# Patient Record
Sex: FEMALE | Race: WHITE | Employment: UNEMPLOYED | URBAN - METROPOLITAN AREA
[De-identification: names, ages, dates, MRNs, and addresses within clinical notes are randomized per-mention and may not be internally consistent; named-entity substitution may affect disease eponyms.]

---

## 2017-05-22 ENCOUNTER — TELEPHONE (OUTPATIENT)
Dept: PALLATIVE CARE | Age: 27
End: 2017-05-22

## 2017-05-22 NOTE — TELEPHONE ENCOUNTER
Patients mom requesting to speak to Dr. Ange Zaragoza or Dr. Mima King. On new treatments, new doctors are suggesting for patient.  Would like to discuss if possible

## 2017-06-15 ENCOUNTER — TELEPHONE (OUTPATIENT)
Dept: PALLATIVE CARE | Age: 27
End: 2017-06-15

## 2017-07-06 ENCOUNTER — DOCUMENTATION ONLY (OUTPATIENT)
Dept: PALLATIVE CARE | Age: 27
End: 2017-07-06

## 2017-07-06 NOTE — PROGRESS NOTES
Palliative Medicine    Discussed case with mother Aman Dickson who feels that while Esme Samuel is more functional as she is living in a 1 story home, her pain management is worse. She continues to have severe attacks inadequately relieved with PCA as well as oral medications. She has stopped having periods and is having worsening bladder symptoms of incontinence. She is finding it difficult to find medical specialists who could guide her care in [place]. Discussed case with Dr. Sujatha Gates MD in [facility] who is currently caring for Ms. Zunilda Pedroza. Dr. Handy Cook is not prepared to adjust the complex opioid regimen she is on. Office notes reviewed - last visit 1/28/17    Medications:  Dilaudid 0.8mg/hr with 2mg every 10 minute bolus  Dilaudid 1mg/ml - 5mg every 2 hours as needed for pain   Oxycodone 20mg/ml - 20mg every 2 hours as needed for pain  Approximately 5 doses of opioid liquid (either dilaudid or oxycodone) in 24 hours  Celexa 10mg daily  Ativan 1mg every 4 hours as needed  Marinol 10mg three times daily  Courtney-colace 2 tabs daily   Vitamin D 50,000 units weekly  Zofran 4mg 2 tabs every 8 hours as needed    Lab Results  Essentially normal from 6/29/16 prior to her move    Recommendations:  1. If there is a palliative specialist to support her management in the ambulatory environment or the home environment, this would be ideal  2. Periodic blood tests I would recommend:  -Annual TSH, Vitamin D, B12&Folate, Iron  And Trans Saturation  -Quarterly CBC and Comprehensive Metabolic with Magnesium and Phosphorus  3. Re-evaluation for Endocrine and Rheumatologic conditions x 1  -Consider specialty consultation with Endocrine given lack of periods and long-term opioid use which can affect hypothalamic-pituitary axis and also affect bone density  -Consider specialty consultation with Rheumatology with Ehler-Danlos Syndrome and its affect on joints (consider ESR, KENZIE, RA, anti-CCP for evaluation)  4.  Prescribe Narcan 0.4mg nasal spray for emergency if accidental overdose    Pain Management Changes  I suspect she has developed rapid cycle withdrawal with the ongoing use of short acting medications  Methadone could be considered in place of the PCA and eventually 1 of the liquid breakthrough medications  She would need to have an EKG to assess the QTc before prescribing to ensure this is not prolonged   I would recommend stopping Dilaudid basal rate of 0.8mg/hr (continuing PCA dose) and starting Methadone 2.5mg every 8 hours x 7 days then increase to 5mg every 8 hours  Dose adjustments no sooner than every 7 days being aware of sedation  If this is tolerated, then we would work with simplifying her short acting breakthrough medications including the Dilaudid PA dose    Article reference:  http://annals. org/aim/article/[email or web address]/ozm-chcljqly-duozghrxv-methadone-treatment

## 2017-08-29 ENCOUNTER — TELEPHONE (OUTPATIENT)
Dept: PALLATIVE CARE | Age: 27
End: 2017-08-29

## 2017-09-08 ENCOUNTER — TELEPHONE (OUTPATIENT)
Dept: PALLATIVE CARE | Age: 27
End: 2017-09-08

## 2017-09-08 NOTE — TELEPHONE ENCOUNTER
Returning call from patient's mother, Que Martin . No answer so left voicemail asking for her to call me back.

## 2021-06-25 ENCOUNTER — TELEPHONE (OUTPATIENT)
Dept: PALLATIVE CARE | Age: 31
End: 2021-06-25

## 2021-06-25 NOTE — TELEPHONE ENCOUNTER
The patient's mother, Karen Wolf states she was in Palliative care and they moved away. Her husbands job is relocating him to [place] and she would like to get her back into Palliative.   # [phone]

## 2021-06-25 NOTE — TELEPHONE ENCOUNTER
Returned call to patient's mother Samson Jett. Yareli Smith was a former Palliative Home patient of Dr. Live Frazier. Patient moved to [place] in 2017. Pt's mother states patient's father is being transferred back to [place] and they are looking for care for patient when they return to the [facility]. She states that Liberty Díaz is currently being seen by a home visit practice in [place] that has a pain management specialist on the team.  According to Ms. Vazquez Jackson condition has improved and her opioid dosage has decreased since she left [place]. She has been diagnosed with a mitochondrial disease. Ms. Vj Up reports that patient can walk very short distances and although she is essentially homebound, they might be able to get her into a physician's office (but probably not more than once a month). Patient has an [employer] insurance plan as primary and Medicaid as secondary insurance. Ms. Vj Up states they are hoping to move to the [facility] in August 2021, probably to the [address] [place] [address]. This nurse will speak to  Dr. Neil White about possible resources for patient and will call Ms. Noah back next week. She is agreeable to this plan.     Geovanni Marcum RN

## 2021-06-28 ENCOUNTER — TELEPHONE (OUTPATIENT)
Dept: PALLATIVE CARE | Age: 31
End: 2021-06-28

## 2021-06-28 NOTE — TELEPHONE ENCOUNTER
[facility] Palliative Medicine Office  Nursing Note  [phone]  Fax [phone]     Name:  Chele Pham  YOB: 1990     This nurse returned call to patient's mother Ana Johnston regarding services for this former Palliative Home patient when the family moves back to [place] in a few months. See this nurse's note from 6/25/21. This nurse heard back from Dr. Mallory Cho and Dr. Tracie Love. Neither physician can accept William Christine for home visits at this time. Since patient is on a lower opioid dose than when she left [place] in 2017 and she is now able to get out of the house for limited visits, this nurse suggested pt's mother contact a pain management specialist in the [place] area.   Two possibilities of physicians that will prescribe pain medication for non-cancer patients are:    Geri Li - Dr. Daniela Fan                                                                                                                                                                                                                                                                                      [address] [place] [address]        [phone]         Dr. Justice Muniz    [phone] [place], [place] [address]    Ms. Liliya Peña states she will call Tiffany Davis and Alberto. If neither of them work out, she will call our office back. She says pt is now taking Methadone 4 times daily and oxycodone for breakthrough pain.     Brett Hartman, BSN, RN  Palliative Medicine  [phone]

## 2021-07-20 ENCOUNTER — TELEPHONE (OUTPATIENT)
Dept: FAMILY MEDICINE CLINIC | Age: 31
End: 2021-07-20

## 2021-07-20 NOTE — TELEPHONE ENCOUNTER
[facility]   Phone:  [phone]      Fax:  [phone] [employer], [address] [place] [address], [address]    Name:  Evelia Alvarado  YOB: 1990    Incoming call from patient's mother Meagan Coelho with an update. Her daughter Gavino Gama is a former Palliative Home patient of Dr. Jackson Cheng. Patient moved to [place]. Patient's father is being transferred back to [place] and they will be relocating to [place] in August.   See this nurse's notes from 6/25/21 and 6/28/21. MsHarley Anand says  she called Tiffany Mliian and Roni and it doesn't look like they are going to work out. [facility] is currently on hold for new patients. This nurse suggested that she try [facility]. [phone].   She says she will call them.        Myrna Novak, LE, RN-BC, [facility]  Referral Navigator, Home Based [address]

## 2022-12-18 ENCOUNTER — HOSPITAL ENCOUNTER (EMERGENCY)
Age: 32
Discharge: HOME | End: 2022-12-18
Attending: EMERGENCY MEDICINE
Payer: COMMERCIAL

## 2022-12-18 ENCOUNTER — APPOINTMENT (OUTPATIENT)
Dept: GENERAL RADIOLOGY | Age: 32
End: 2022-12-18
Attending: EMERGENCY MEDICINE
Payer: COMMERCIAL

## 2022-12-18 ENCOUNTER — APPOINTMENT (OUTPATIENT)
Dept: CT IMAGING | Age: 32
End: 2022-12-18
Attending: EMERGENCY MEDICINE
Payer: COMMERCIAL

## 2022-12-18 VITALS
HEIGHT: 67 IN | OXYGEN SATURATION: 98 % | RESPIRATION RATE: 12 BRPM | DIASTOLIC BLOOD PRESSURE: 68 MMHG | WEIGHT: 168 LBS | HEART RATE: 65 BPM | TEMPERATURE: 98.8 F | BODY MASS INDEX: 26.37 KG/M2 | SYSTOLIC BLOOD PRESSURE: 104 MMHG

## 2022-12-18 DIAGNOSIS — R07.9 CHEST PAIN, UNSPECIFIED TYPE: Primary | ICD-10-CM

## 2022-12-18 DIAGNOSIS — U07.1 COVID: ICD-10-CM

## 2022-12-18 LAB
ALBUMIN SERPL-MCNC: 3.1 G/DL (ref 3.5–5)
ALBUMIN/GLOB SERPL: 0.8 {RATIO} (ref 1.1–2.2)
ALP SERPL-CCNC: 104 U/L (ref 45–117)
ALT SERPL-CCNC: 61 U/L (ref 12–78)
ANION GAP SERPL CALC-SCNC: 4 MMOL/L (ref 5–15)
AST SERPL-CCNC: 41 U/L (ref 15–37)
BASOPHILS # BLD: 0 K/UL (ref 0–0.1)
BASOPHILS NFR BLD: 1 % (ref 0–1)
BILIRUB SERPL-MCNC: 0.3 MG/DL (ref 0.2–1)
BUN SERPL-MCNC: 16 MG/DL (ref 6–20)
BUN/CREAT SERPL: 29 (ref 12–20)
CALCIUM SERPL-MCNC: 8.3 MG/DL (ref 8.5–10.1)
CHLORIDE SERPL-SCNC: 108 MMOL/L (ref 97–108)
CO2 SERPL-SCNC: 29 MMOL/L (ref 21–32)
COMMENT, HOLDF: NORMAL
CREAT SERPL-MCNC: 0.56 MG/DL (ref 0.55–1.02)
D DIMER PPP FEU-MCNC: 1.13 MG/L FEU (ref 0–0.65)
DIFFERENTIAL METHOD BLD: NORMAL
EOSINOPHIL # BLD: 0.1 K/UL (ref 0–0.4)
EOSINOPHIL NFR BLD: 1 % (ref 0–7)
ERYTHROCYTE [DISTWIDTH] IN BLOOD BY AUTOMATED COUNT: 12 % (ref 11.5–14.5)
GLOBULIN SER CALC-MCNC: 3.8 G/DL (ref 2–4)
GLUCOSE SERPL-MCNC: 105 MG/DL (ref 65–100)
HCT VFR BLD AUTO: 37.6 % (ref 35–47)
HGB BLD-MCNC: 12.3 G/DL (ref 11.5–16)
IMM GRANULOCYTES # BLD AUTO: 0 K/UL (ref 0–0.04)
IMM GRANULOCYTES NFR BLD AUTO: 0 % (ref 0–0.5)
LYMPHOCYTES # BLD: 1.6 K/UL (ref 0.8–3.5)
LYMPHOCYTES NFR BLD: 28 % (ref 12–49)
MCH RBC QN AUTO: 30.4 PG (ref 26–34)
MCHC RBC AUTO-ENTMCNC: 32.7 G/DL (ref 30–36.5)
MCV RBC AUTO: 93.1 FL (ref 80–99)
MONOCYTES # BLD: 0.5 K/UL (ref 0–1)
MONOCYTES NFR BLD: 9 % (ref 5–13)
NEUTS SEG # BLD: 3.4 K/UL (ref 1.8–8)
NEUTS SEG NFR BLD: 61 % (ref 32–75)
NRBC # BLD: 0 K/UL (ref 0–0.01)
NRBC BLD-RTO: 0 PER 100 WBC
PLATELET # BLD AUTO: 168 K/UL (ref 150–400)
PMV BLD AUTO: 11.5 FL (ref 8.9–12.9)
POTASSIUM SERPL-SCNC: 3.9 MMOL/L (ref 3.5–5.1)
PROT SERPL-MCNC: 6.9 G/DL (ref 6.4–8.2)
RBC # BLD AUTO: 4.04 M/UL (ref 3.8–5.2)
SAMPLES BEING HELD,HOLD: NORMAL
SODIUM SERPL-SCNC: 141 MMOL/L (ref 136–145)
TROPONIN-HIGH SENSITIVITY: <4 NG/L (ref 0–51)
TROPONIN-HIGH SENSITIVITY: <4 NG/L (ref 0–51)
WBC # BLD AUTO: 5.6 K/UL (ref 3.6–11)

## 2022-12-18 PROCEDURE — 93005 ELECTROCARDIOGRAM TRACING: CPT

## 2022-12-18 PROCEDURE — 85379 FIBRIN DEGRADATION QUANT: CPT

## 2022-12-18 PROCEDURE — 80053 COMPREHEN METABOLIC PANEL: CPT

## 2022-12-18 PROCEDURE — 99285 EMERGENCY DEPT VISIT HI MDM: CPT

## 2022-12-18 PROCEDURE — 84484 ASSAY OF TROPONIN QUANT: CPT

## 2022-12-18 PROCEDURE — 36415 COLL VENOUS BLD VENIPUNCTURE: CPT

## 2022-12-18 PROCEDURE — 71275 CT ANGIOGRAPHY CHEST: CPT

## 2022-12-18 PROCEDURE — 74011000636 HC RX REV CODE- 636: Performed by: RADIOLOGY

## 2022-12-18 PROCEDURE — 85025 COMPLETE CBC W/AUTO DIFF WBC: CPT

## 2022-12-18 PROCEDURE — 71045 X-RAY EXAM CHEST 1 VIEW: CPT

## 2022-12-18 RX ADMIN — IOPAMIDOL 64 ML: 755 INJECTION, SOLUTION INTRAVENOUS at 04:02

## 2022-12-18 NOTE — ED PROVIDER NOTES
82-year-old female with history of mitochondrial disorder, gastroparesis, GERD, dysautonomia, complex regional pain syndrome on chronic narcotic therapy presents to the emergency department by EMS with a chief complaint of chest pressure. Symptoms for the past hour. She was diagnosed with COVID yesterday. Her mother provides a significant portion of the history tells me that she was bedbound for 7 years due to a perfect storm\" of her chronic medical conditions. Previous Dilaudid PCA at home. The history is provided by the patient, a parent and medical records. Chest Pain (Angina)   This is a new problem. The problem occurs constantly. Associated symptoms include weakness (Chronic). Pertinent negatives include no abdominal pain, no cough, no fever, no headaches, no nausea, no shortness of breath and no vomiting.       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 No evidence of iron overload     Other ill-defined conditions(799.89)     Disautonomia 7/2013    Stool color black        Past Surgical History:   Procedure Laterality Date    HX ORTHOPAEDIC      HX PACEMAKER PLACEMENT      For Gastroparesis         Family History:   Problem Relation Age of Onset    Hypertension Mother     Hypertension Father        Social History     Socioeconomic History    Marital status: SINGLE     Spouse name: Not on file    Number of children: Not on file    Years of education: Not on file    Highest education level: Not on file   Occupational History    Not on file   Tobacco Use    Smoking status: Never    Smokeless tobacco: Never   Substance and Sexual Activity    Alcohol use: No    Drug use: No    Sexual activity: Never   Other Topics Concern    Not on file   Social History Narrative    Not on file     Social Determinants of [facility]     Financial Resource Strain: Not on file   Food Insecurity: Not on file   Transportation Needs: Not on file   Physical Activity: Not on file   Stress: Not on file   Social Connections: Not on file   Intimate Partner Violence: Not on file   Housing Stability: Not on file         ALLERGIES: Atropine, Compazine [prochlorperazine edisylate], Phenergan [promethazine], and Reglan [metoclopramide]    Review of Systems   Constitutional:  Positive for fatigue. Negative for fever. HENT:  Negative for sneezing and sore throat. Respiratory:  Negative for cough and shortness of breath. Cardiovascular:  Positive for chest pain. Negative for leg swelling. Gastrointestinal:  Negative for abdominal pain, diarrhea, nausea and vomiting. Genitourinary:  Negative for difficulty urinating and dysuria. Musculoskeletal:  Negative for arthralgias and myalgias. Skin:  Negative for color change and rash. Neurological:  Positive for weakness (Chronic). Negative for headaches. Psychiatric/Behavioral:  Negative for agitation and behavioral problems. There were no vitals filed for this visit. Physical Exam  Vitals and nursing note reviewed. Constitutional:       General: She is not in acute distress. Appearance: Normal appearance. She is well-developed. She is not ill-appearing, toxic-appearing or diaphoretic. HENT:      Head: Normocephalic and atraumatic. Nose: Nose normal.      Mouth/Throat:      Mouth: Mucous membranes are moist.      Pharynx: Oropharynx is clear. Eyes:      Extraocular Movements: Extraocular movements intact. Conjunctiva/sclera: Conjunctivae normal.      Pupils: Pupils are equal, round, and reactive to light. Cardiovascular:      Rate and Rhythm: Normal rate and regular rhythm. Pulses: Normal pulses. Heart sounds: Normal heart sounds. Pulmonary:      Effort: Pulmonary effort is normal. No respiratory distress.       Breath sounds: Normal breath sounds. No wheezing. Chest:      Chest wall: No tenderness. Abdominal:      General: Abdomen is flat. There is no distension. Palpations: Abdomen is soft. Tenderness: There is no abdominal tenderness. There is no guarding or rebound. Musculoskeletal:         General: No swelling, tenderness, deformity or signs of injury. Normal range of motion. Cervical back: Normal range of motion and neck supple. No rigidity. No muscular tenderness. Right lower leg: No edema. Left lower leg: No edema. Skin:     General: Skin is warm and dry. Capillary Refill: Capillary refill takes less than 2 seconds. Neurological:      General: No focal deficit present. Mental Status: She is alert and oriented to person, place, and time. Psychiatric:         Mood and Affect: Mood normal.         Behavior: Behavior normal.        MDM  Number of Diagnoses or Management Options  Chest pain, unspecified type  COVID  Diagnosis management comments: 72-year-old female presents as above with chest pain in the setting of COVID infection. Work-up in the emergency department is reassuring with low risk for ACS. No evidence of PTX, PE or other serious pathology on CT scan. No focal pneumonia. Elevated D-dimer likely related to inflammatory state associated with COVID. She can be safely discharged home with instructions to follow-up with primary care and return if needed. Amount and/or Complexity of Data Reviewed  Clinical lab tests: reviewed  Tests in the radiology section of CPT®: reviewed      ED Course as of 12/18/22 0526   Sun Dec 18, 2022   0202   ED EKG interpretation:  Rhythm: normal sinus rhythm. Rate (approx.): 84. Axis: normal.  ST segment:  No concerning ST elevations or depressions. This EKG was interpreted by Earnestine Lefort, MD,ED Provider. [JM]   0959 I have independently viewed the obtained radiographic images and note chest x-ray without acute findings. Will await radiology read. [JM]   9442 I have independently viewed the obtained radiographic images and note CTA chest without PE, no significant lung parenchymal abnormality. Will await radiology read.  [JM]      ED Course User Index  [JM] Jerica Elmore MD       Procedures

## 2022-12-19 LAB
ATRIAL RATE: 84 BPM
CALCULATED P AXIS, ECG09: 24 DEGREES
CALCULATED R AXIS, ECG10: 51 DEGREES
CALCULATED T AXIS, ECG11: 32 DEGREES
DIAGNOSIS, 93000: NORMAL
P-R INTERVAL, ECG05: 132 MS
Q-T INTERVAL, ECG07: 390 MS
QRS DURATION, ECG06: 72 MS
QTC CALCULATION (BEZET), ECG08: 460 MS
VENTRICULAR RATE, ECG03: 84 BPM